# Patient Record
Sex: FEMALE | Race: BLACK OR AFRICAN AMERICAN | NOT HISPANIC OR LATINO | Employment: UNEMPLOYED | URBAN - METROPOLITAN AREA
[De-identification: names, ages, dates, MRNs, and addresses within clinical notes are randomized per-mention and may not be internally consistent; named-entity substitution may affect disease eponyms.]

---
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
## 2024-02-14 ENCOUNTER — OFFICE VISIT (OUTPATIENT)
Dept: RHEUMATOLOGY | Facility: CLINIC | Age: 19
End: 2024-02-14
Attending: PEDIATRICS
Payer: COMMERCIAL

## 2024-02-14 VITALS
SYSTOLIC BLOOD PRESSURE: 119 MMHG | OXYGEN SATURATION: 100 % | TEMPERATURE: 97.6 F | WEIGHT: 147.93 LBS | RESPIRATION RATE: 16 BRPM | DIASTOLIC BLOOD PRESSURE: 77 MMHG | HEIGHT: 67 IN | HEART RATE: 84 BPM | BODY MASS INDEX: 23.22 KG/M2

## 2024-02-14 DIAGNOSIS — M08.3 POLYARTICULAR RF NEGATIVE JIA (JUVENILE IDIOPATHIC ARTHRITIS) (H): Primary | ICD-10-CM

## 2024-02-14 PROCEDURE — 99214 OFFICE O/P EST MOD 30 MIN: CPT | Performed by: PEDIATRICS

## 2024-02-14 PROCEDURE — G0463 HOSPITAL OUTPT CLINIC VISIT: HCPCS | Performed by: PEDIATRICS

## 2024-02-14 ASSESSMENT — PAIN SCALES - GENERAL: PAINLEVEL: NO PAIN (0)

## 2024-02-14 NOTE — PATIENT INSTRUCTIONS
For Patient Education Materials:  z.[facility]/farzad       [facility] Pediatric Rheumatology    For Help:  The Pediatric Call Center at [phone] can help with scheduling of routine follow up visits.  Latricia Kirk and Hayley Williamson are the Nurse Coordinators for the Division of Pediatric Rheumatology and can be reached by phone at [phone] or through [email or web address] ([email or web address]). They can help with questions about your child s rheumatic condition, medications, and test results.  For emergencies after hours or on the weekends, please call the page  at [phone] and ask to speak to the physician on-call for Pediatric Rheumatology. Please do not use [email or web address] for urgent requests.  Main  Services:  [phone]  Hmong/Costa Rican/Mauritian: [phone]  Citizen of [place]: [phone]  Beninese: [phone]    Internal Referrals: If we refer your child to another physician/team within [facility], you should receive a call to set this up. If you do not hear anything within a week, please call the Call Center at [phone].    External Referrals: If we refer your child to a physician/team outside of [facility], our team will send the referral order and relevant records to them. We ask that you call the place where your child is being referred to ensure they received the needed information and notify our team coordinators if not.    Imaging: If your child needs an imaging study that is not being performed the day of your clinic appointment, please call to set this up. For xrays, ultrasounds, and echocardiogram call [phone]. For CT or MRI call [phone].     MyChart: We encourage you to sign up for [employer] at [email or web address]. For assistance or questions, call [phone]. If your child is 12 years or older, a consent for proxy/parent access needs to be signed so please discuss this with your physician at the next visit.

## 2024-02-14 NOTE — LETTER
February 14, 2024      Danette Lynn  [address]  [address]  [facility] [address]  2005      To Whom It May Concern:    This patient missed school 02/14/24 due to a clinic visit.     Please contact me at [phone] or our Pediatric Rheumatology nurses at [phone] for any questions or concerns.    Sincerely,      Walter Anderson MD PhD

## 2024-02-14 NOTE — LETTER
2/14/2024      RE: Danette Lynn  [address]  [address]  [facility] [address]     Dear Colleague,    Thank you for the opportunity to participate in the care of your patient, Danette Lynn, at the [facility]. Please see a copy of my visit note below.        Rheumatology History:   Date of symptom onset: 2/15/2013  Date of first visit to center: 5/15/2013  Date of PATRICIA diagnosis: 5/15/2013  ILAR category: polyarticular (RF-negative)  RJ Status: negative   RF Status: negative   CCP Status: negative   HLA-B27 Status: not done        Ophthalmology History:   Iritis/Uveitis Comorbidity: no   Date of last eye exam:            Medications:   As of completion of this visit:  Current Outpatient Medications   Medication Sig Dispense Refill    adalimumab (HUMIRA *CF*) 40 MG/0.4ML prefilled syringe kit Inject 0.4 mLs (40 mg) Subcutaneous every 21 days PLEASE SCHEDULE AN APPOINTMENT 2 each 11    cetirizine HCl (ZYRTEC ALLERGY) 10 MG CAPS Take 1 capsule (10 mg) by mouth daily 30 capsule 11    multivitamin, therapeutic (THERA-VIT) TABS tablet Take 1 tablet by mouth daily (Patient not taking: Reported on 2/8/2023) 30 tablet 11         Danette is tolerating the medication(s) well.       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  Danette was last seen in our clinic on 2/8/2023 and returns today accompanied by her father.  The encounter diagnosis was Polyarticular RF negative PATRICIA (juvenile  "idiopathic arthritis) (H).     I have not seen her in nearly a year.  She is doing very well.  She had been spacing out the Humira and actually stopped taking it in September 2023 (5 months ago) she reports no symptoms of arthritis such as joint stiffness swelling warmth or pain.  Her overall health has been good although she has a mild cold right now.    She graduated from high school and is enrolled at [employer].  She is planning to major in biology and hopes to become a PA.  She is living at home with her family.      Information per our standardized questionnaire is as below:    Self Report  Patient Pain Status: 0 (This is measured 0 = no pain, 10 = very severe pain)  Patient Global Assessment of Disease Activity: 0 (This is measured 0 = very well, 10 = very poorly)  Patient Highest Level of Education: high school     Interim Arthritis History  Morning Stiffness in the past week: no stiffness  Recent Back Pain: No    Since your last visit has your arthritis stopped you from trying any athletic or rigorous activities or interfaced with your ability to do these activities? No  Have you been limited your ability to do normal daily activities in the past week? No  Did you need help from other people to do normal activities in the past week? No  Have you used any aids or devices to help you do normal daily activities in the past week? No              Review of Systems:   A comprehensive review of systems was performed and was negative apart from that listed above.             Examination:   Blood pressure 119/77, pulse 84, temperature 97.6  F (36.4  C), temperature source Tympanic, resp. rate 16, height 1.707 m (5' 7.21\"), weight 67.1 kg (147 lb 14.9 oz), SpO2 100%.  Facility age limit for growth %tommy is 20 years.  Growth %ile SmartLinks can only be used for patients less than 20 years old.  Body surface area is 1.78 meters squared.     In general Danette was well appearing and in good spirits.   HEENT:  Pupils were " equal, round and reactive to light.  Nose normal.  Oropharynx moist and pink with no intraoral lesions.  NECK:  Supple, no lymphadenopathy.  CHEST:  Clear to auscultation.  HEART:  Regular rate and rhythm.  No murmur.  ABDOMEN:  Soft, non-tender, no hepatosplenomegaly.  JOINTS: Normal.   SKIN:  Normal.      Total active joints:  0   Total limited joints:  0  Tender entheses count:  0           Lab Test Results:   None today.       Assessment:   Danette is a 19 year old  with   1. 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  This is a very unusual but obviously desirable outcome.  I think it is fine for her to continue off medications for arthritis.    ACR Functional Class: Normal  Provider assessment of disease activity: 0 (This is measured 0 = inactive 10 = highly active)    Treat to Target:   [id number] score: 0           Plan:     Continue off medications for arthritis  Continue screening eye exams for uveitis yearly.  He may follow-up with me on an as-needed basis.  We had her sign up for [employer] today so that she can reach us easily.  When she finishes college she would be more appropriate for her to see an adult rheumatologist, if she needs ongoing rheumatology care.      It is a pleasure to continue to participate in Danette's care.  Please feel free to contact me with any questions or concerns you have regarding Danette's care. If there are any new questions or concerns, I would be glad to help and can be reached through our main office at [phone] or our paging  at [phone].    Walter Anderson MD, PhD  Professor, Pediatric Rheumatology    30 min spent on the date of the encounter in chart review, patient visit, review of tests, documentation and/or discussion with other providers about the issues documented above.

## 2024-02-14 NOTE — NURSING NOTE
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     Vitals:    02/14/24 0906   BP: 119/77   BP Location: Right arm   Patient Position: Chair   Pulse: 84   Resp: 16   Temp: 97.6  F (36.4  C)   TempSrc: Tympanic   SpO2: 100%   Weight: 147 lb 14.9 oz (67.1 kg)   Height: 5' 7.21\" (170.7 cm)           Luisana Covarrubias M.A.    February 14, 2024  "

## 2024-02-14 NOTE — PROGRESS NOTES
Rheumatology History:   Date of symptom onset: 2/15/2013  Date of first visit to center: 5/15/2013  Date of PATRICIA diagnosis: 5/15/2013  ILAR category: polyarticular (RF-negative)  RJ Status: negative   RF Status: negative   CCP Status: negative   HLA-B27 Status: not done        Ophthalmology History:   Iritis/Uveitis Comorbidity: no   Date of last eye exam:            Medications:   As of completion of this visit:  Current Outpatient Medications   Medication Sig Dispense Refill    adalimumab (HUMIRA *CF*) 40 MG/0.4ML prefilled syringe kit Inject 0.4 mLs (40 mg) Subcutaneous every 21 days PLEASE SCHEDULE AN APPOINTMENT 2 each 11    cetirizine HCl (ZYRTEC ALLERGY) 10 MG CAPS Take 1 capsule (10 mg) by mouth daily 30 capsule 11    multivitamin, therapeutic (THERA-VIT) TABS tablet Take 1 tablet by mouth daily (Patient not taking: Reported on 2/8/2023) 30 tablet 11         Danette is tolerating the medication(s) well.       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  Danette was last seen in our clinic on 2/8/2023 and returns today accompanied by her father.  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     I have not seen her in nearly a year.  She is doing very well.  She had been spacing out the Humira and actually stopped taking it in September 2023 (5 months ago) she reports no symptoms of arthritis such as joint stiffness swelling warmth or pain.  Her overall health has been good although she has a mild cold right now.    She  "graduated from high school and is enrolled at [employer].  She is planning to major in biology and hopes to become a PA.  She is living at home with her family.      Information per our standardized questionnaire is as below:    Self Report  Patient Pain Status: 0 (This is measured 0 = no pain, 10 = very severe pain)  Patient Global Assessment of Disease Activity: 0 (This is measured 0 = very well, 10 = very poorly)  Patient Highest Level of Education: high school     Interim Arthritis History  Morning Stiffness in the past week: no stiffness  Recent Back Pain: No    Since your last visit has your arthritis stopped you from trying any athletic or rigorous activities or interfaced with your ability to do these activities? No  Have you been limited your ability to do normal daily activities in the past week? No  Did you need help from other people to do normal activities in the past week? No  Have you used any aids or devices to help you do normal daily activities in the past week? No              Review of Systems:   A comprehensive review of systems was performed and was negative apart from that listed above.             Examination:   Blood pressure 119/77, pulse 84, temperature 97.6  F (36.4  C), temperature source Tympanic, resp. rate 16, height 1.707 m (5' 7.21\"), weight 67.1 kg (147 lb 14.9 oz), SpO2 100%.  Facility age limit for growth %tommy is 20 years.  Growth %ile SmartLinks can only be used for patients less than 20 years old.  Body surface area is 1.78 meters squared.     In general Danette was well appearing and in good spirits.   HEENT:  Pupils were equal, round and reactive to light.  Nose normal.  Oropharynx moist and pink with no intraoral lesions.  NECK:  Supple, no lymphadenopathy.  CHEST:  Clear to auscultation.  HEART:  Regular rate and rhythm.  No murmur.  ABDOMEN:  Soft, non-tender, no hepatosplenomegaly.  JOINTS: Normal.   SKIN:  Normal.      Total active joints:  0   Total limited joints:  " 0  Tender entheses count:  0           Lab Test Results:   None today.       Assessment:   Danette is a 19 year old  with   1. 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  This is a very unusual but obviously desirable outcome.  I think it is fine for her to continue off medications for arthritis.    ACR Functional Class: Normal  Provider assessment of disease activity: 0 (This is measured 0 = inactive 10 = highly active)    Treat to Target:   [id number] score: 0           Plan:     Continue off medications for arthritis  Continue screening eye exams for uveitis yearly.  He may follow-up with me on an as-needed basis.  We had her sign up for [employer] today so that she can reach us easily.  When she finishes college she would be more appropriate for her to see an adult rheumatologist, if she needs ongoing rheumatology care.      It is a pleasure to continue to participate in Danette's care.  Please feel free to contact me with any questions or concerns you have regarding Danette's care. If there are any new questions or concerns, I would be glad to help and can be reached through our main office at [phone] or our paging  at [phone].    Walter Anderson MD, PhD  Professor, Pediatric Rheumatology    30 min spent on the date of the encounter in chart review, patient visit, review of tests, documentation and/or discussion with other providers about the issues documented above.

## 2024-02-14 NOTE — NURSING NOTE
Peds Outpatient BP  1) Rested for 5 minutes, BP taken on bare arm, patient sitting (or supine for infants) w/ legs uncrossed?   Yes  2) Right arm used?  Right arm   Yes  3) Arm circumference of largest part of upper arm (in cm): 28  4) BP cuff sized used: Adult (25-32cm)   If used different size cuff then what was recommended why? N/A  5) First BP reading:machine   BP Readings from Last 1 Encounters:   02/14/24 119/77      Is reading >90%?No   (90% for <1 years is 90/50)  (90% for >18 years is 140/90)  *If a machine BP is at or above 90% take manual BP  6) Manual BP reading: N/A  7) Other comments: None    Luisana Covarrubias CMA.

## 2024-02-18 ENCOUNTER — HEALTH MAINTENANCE LETTER (OUTPATIENT)
Age: 19
End: 2024-02-18

## 2024-03-06 ENCOUNTER — TELEPHONE (OUTPATIENT)
Dept: RHEUMATOLOGY | Facility: CLINIC | Age: 19
End: 2024-03-06
Payer: COMMERCIAL

## 2024-03-06 DIAGNOSIS — M08.3 POLYARTICULAR RF NEGATIVE JIA (JUVENILE IDIOPATHIC ARTHRITIS) (H): ICD-10-CM

## 2024-03-06 RX ORDER — ADALIMUMAB 40MG/0.4ML
40 KIT SUBCUTANEOUS
Qty: 2 EACH | Refills: 11 | Status: SHIPPED | OUTPATIENT
Start: 2024-03-06

## 2024-03-06 NOTE — TELEPHONE ENCOUNTER
PA Initiation    Medication: HUMIRA *CF* 40 MG/0.4ML SC PSKT  Insurance Company: [employer] - Phone [phone] Fax [phone]  Pharmacy Filling the Rx:    Filling Pharmacy Phone:    Filling Pharmacy Fax:    Start Date: 3/6/2024

## 2024-03-07 NOTE — TELEPHONE ENCOUNTER
Prior Authorization Approval    Medication: HUMIRA *CF* 40 MG/0.4ML SC PSKT  Authorization Effective Date: 3/7/2024  Authorization Expiration Date: 3/7/2025  Approved Dose/Quantity:   Reference #: Key: [id number]   Insurance Company: [employer] Phone [phone] Fax [phone]  Expected CoPay: $    CoPay Card Available:      Financial Assistance Needed:   Which Pharmacy is filling the prescription:    Pharmacy Notified: released new Rx marked urgent and sent email  Patient Notified:  sent My Chart msg

## 2025-03-09 ENCOUNTER — HEALTH MAINTENANCE LETTER (OUTPATIENT)
Age: 20
End: 2025-03-09

## 2025-03-12 DIAGNOSIS — M08.3 POLYARTICULAR RF NEGATIVE JIA (JUVENILE IDIOPATHIC ARTHRITIS) (H): ICD-10-CM

## 2025-03-12 RX ORDER — ADALIMUMAB 40MG/0.4ML
40 KIT SUBCUTANEOUS
Qty: 2 EACH | Refills: 11 | Status: CANCELLED | OUTPATIENT
Start: 2025-03-12

## 2025-03-14 DIAGNOSIS — M08.3 POLYARTICULAR RF NEGATIVE JIA (JUVENILE IDIOPATHIC ARTHRITIS) (H): ICD-10-CM

## 2025-03-17 RX ORDER — ADALIMUMAB 40MG/0.4ML
40 KIT SUBCUTANEOUS
Qty: 2 EACH | Refills: 0 | OUTPATIENT
Start: 2025-03-17

## 2025-03-17 NOTE — TELEPHONE ENCOUNTER
Hello this was approved but not yet released to us and pt has a del set for 3/21 can we please get this sent over to us please and thank you

## 2025-03-18 NOTE — CONFIDENTIAL NOTE
[facility]  I am covering Dr. Anderson's inbox while he is out of the office.  Has an appointment with Dr. Anderson on 3/27/2025 and can get prescription decision then.      Mireya Sutton M.D.   of Pediatrics  Pediatric Rheumatology

## 2025-03-21 ENCOUNTER — TELEPHONE (OUTPATIENT)
Dept: RHEUMATOLOGY | Facility: CLINIC | Age: 20
End: 2025-03-21

## 2025-03-21 NOTE — TELEPHONE ENCOUNTER
[facility] Call Center    Phone Message    May a detailed message be left on voicemail: yes     Reason for Call: Medication Refill Request    Has the patient contacted the pharmacy for the refill? Yes   Name of medication being requested: Humira  Provider who prescribed the medication: Walter Anderson  Pharmacy: [place] [facility]   Date medication is needed: 03/21/2025       Action Taken: Message routed to:  Other: Peds Rheumatology     Travel Screening: Not Applicable     Date of Service:

## 2025-03-27 ENCOUNTER — OFFICE VISIT (OUTPATIENT)
Dept: RHEUMATOLOGY | Facility: CLINIC | Age: 20
End: 2025-03-27
Attending: PEDIATRICS
Payer: COMMERCIAL

## 2025-03-27 VITALS
SYSTOLIC BLOOD PRESSURE: 117 MMHG | TEMPERATURE: 97.8 F | BODY MASS INDEX: 21.63 KG/M2 | HEIGHT: 67 IN | OXYGEN SATURATION: 98 % | DIASTOLIC BLOOD PRESSURE: 73 MMHG | HEART RATE: 91 BPM | WEIGHT: 137.79 LBS

## 2025-03-27 DIAGNOSIS — M08.3 POLYARTICULAR RF NEGATIVE JIA (JUVENILE IDIOPATHIC ARTHRITIS) (H): ICD-10-CM

## 2025-03-27 LAB
ALT SERPL W P-5'-P-CCNC: 8 U/L (ref 0–50)
AST SERPL W P-5'-P-CCNC: 13 U/L (ref 0–45)
BASOPHILS # BLD AUTO: 0 10E3/UL (ref 0–0.2)
BASOPHILS NFR BLD AUTO: 0 %
CRP SERPL-MCNC: <3 MG/L
EOSINOPHIL # BLD AUTO: 0.1 10E3/UL (ref 0–0.7)
EOSINOPHIL NFR BLD AUTO: 1 %
ERYTHROCYTE [DISTWIDTH] IN BLOOD BY AUTOMATED COUNT: 12.5 % (ref 10–15)
ERYTHROCYTE [SEDIMENTATION RATE] IN BLOOD BY WESTERGREN METHOD: 25 MM/HR (ref 0–20)
HCT VFR BLD AUTO: 40.8 % (ref 35–47)
HGB BLD-MCNC: 13.1 G/DL (ref 11.7–15.7)
IMM GRANULOCYTES # BLD: 0 10E3/UL
IMM GRANULOCYTES NFR BLD: 0 %
LYMPHOCYTES # BLD AUTO: 1.9 10E3/UL (ref 0.8–5.3)
LYMPHOCYTES NFR BLD AUTO: 28 %
MCH RBC QN AUTO: 27.8 PG (ref 26.5–33)
MCHC RBC AUTO-ENTMCNC: 32.1 G/DL (ref 31.5–36.5)
MCV RBC AUTO: 87 FL (ref 78–100)
MONOCYTES # BLD AUTO: 0.5 10E3/UL (ref 0–1.3)
MONOCYTES NFR BLD AUTO: 7 %
NEUTROPHILS # BLD AUTO: 4.4 10E3/UL (ref 1.6–8.3)
NEUTROPHILS NFR BLD AUTO: 64 %
NRBC # BLD AUTO: 0 10E3/UL
NRBC BLD AUTO-RTO: 0 /100
PLATELET # BLD AUTO: 172 10E3/UL (ref 150–450)
RBC # BLD AUTO: 4.71 10E6/UL (ref 3.8–5.2)
WBC # BLD AUTO: 6.9 10E3/UL (ref 4–11)

## 2025-03-27 PROCEDURE — 85041 AUTOMATED RBC COUNT: CPT | Performed by: PEDIATRICS

## 2025-03-27 PROCEDURE — 36415 COLL VENOUS BLD VENIPUNCTURE: CPT | Performed by: PEDIATRICS

## 2025-03-27 PROCEDURE — 84450 TRANSFERASE (AST) (SGOT): CPT | Performed by: PEDIATRICS

## 2025-03-27 PROCEDURE — 85652 RBC SED RATE AUTOMATED: CPT | Performed by: PEDIATRICS

## 2025-03-27 PROCEDURE — 85004 AUTOMATED DIFF WBC COUNT: CPT | Performed by: PEDIATRICS

## 2025-03-27 PROCEDURE — 84460 ALANINE AMINO (ALT) (SGPT): CPT | Performed by: PEDIATRICS

## 2025-03-27 PROCEDURE — 86140 C-REACTIVE PROTEIN: CPT | Performed by: PEDIATRICS

## 2025-03-27 RX ORDER — ADALIMUMAB 40MG/0.4ML
40 KIT SUBCUTANEOUS
Qty: 2 EACH | Refills: 11 | OUTPATIENT
Start: 2025-03-27

## 2025-03-27 ASSESSMENT — PAIN SCALES - GENERAL: PAINLEVEL_OUTOF10: NO PAIN (0)

## 2025-03-27 NOTE — PATIENT INSTRUCTIONS
For Patient Education Materials:  z.[facility]/farzad       [facility] Physicians Pediatric Rheumatology    For Help:  The Pediatric Call Center at [phone] can help with scheduling of routine follow up visits.  Latricia Kirk and Hayley Williamson are the Nurse Coordinators for the Division of Pediatric Rheumatology and can be reached by phone at [phone] or through [employer] ([email or web address]). They can help with questions about your child s rheumatic condition, medications, and test results.  For emergencies after hours or on the weekends, please call the page  at [phone] and ask to speak to the physician on-call for Pediatric Rheumatology. Please do not use [employer] for urgent requests.  Main  Services:  [phone]  Hmong/Hong Konger/Monegasque: [phone]  Macanese: [phone]  Croatian: [phone]    Internal Referrals: If we refer your child to another physician/team within [facility], you should receive a call to set this up. If you do not hear anything within a week, please call the Call Center at [phone].    External Referrals: If we refer your child to a physician/team outside of [facility], our team will send the referral order and relevant records to them. We ask that you call the place where your child is being referred to ensure they received the needed information and notify our team coordinators if not.    Imaging: If your child needs an imaging study that is not being performed the day of your clinic appointment, please call to set this up. For xrays, ultrasounds, and echocardiogram call [phone]. For CT or MRI call [phone].     MyChart: We encourage you to sign up for [employer] at [email or web address]. For assistance or questions, call [phone]. If your child is 12 years or older, a consent for proxy/parent access needs to be signed so please discuss this with your physician at the next visit.

## 2025-03-27 NOTE — NURSING NOTE
"Chief Complaint   Patient presents with    RECHECK       Vitals:    03/27/25 1006   BP: 117/73   BP Location: Right arm   Patient Position: Sitting   Cuff Size: Adult Regular   Pulse: 91   Temp: 97.8  F (36.6  C)   TempSrc: Skin   SpO2: 98%   Weight: 137 lb 12.6 oz (62.5 kg)   Height: 5' 7.36\" (171.1 cm)     Patient MyChart Active? Yes    Does patient need PHQ-2 completed today? Yes    Jb Olmos  March 27, 2025  "

## 2025-03-27 NOTE — PROGRESS NOTES
"    Rheumatology History:   Date of symptom onset: 2/15/2013  Date of first visit to center: 5/15/2013  Date of PATRICIA diagnosis: 5/15/2013  ILAR category: polyarticular (RF-negative)  RJ Status: negative   RF Status: negative   CCP Status: negative   HLA-B27 Status: not done        Ophthalmology History:   Iritis/Uveitis Comorbidity: no   Date of last eye exam:   \"a couple of years ago\"         Medications:   As of completion of this visit:  Current Outpatient Medications   Medication Sig Dispense Refill    adalimumab (HUMIRA *CF*) 40 MG/0.4ML prefilled syringe kit Inject 0.4 mLs (40 mg) subcutaneously every 14 days. 2 each 11         Danette is tolerating the medication(s) well.       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  Danette was last seen in our clinic on 2/14/2024 and returns today for scheduled follow up.  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     It has been over a year since I last saw her.  She had been spacing out the adalimumab, and then was taking it sporadically.  Around 2 months ago she noticed that her fingers and feet were becoming more swollen.  It was difficult for her to wear certain shoes.  She therefore contacted my office and restarted the Humira.  She has now had a couple of doses of the usual every 14-day dosing.  She feels that her fingers and feet are getting better.  She does continue to have a little bit of stiffness in the morning.    Her overall health has been good.    She is going to " "[employer] and is hoping to become a PA or nurse.  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  She continues to live at home with her family.      Information per our standardized questionnaire is as below:    Self Report  Patient Pain Status: 2 (This is measured 0 = no pain, 10 = very severe pain)  Patient Global Assessment of Disease Activity: 1.5 (This is measured 0 = very well, 10 = very poorly)  Patient Highest Level of Education: high school     Interim Arthritis History  Morning Stiffness in the past week: no stiffness  Recent Back Pain: No    Since your last visit has your arthritis stopped you from trying any athletic or rigorous activities or interfaced with your ability to do these activities? No  Have you been limited your ability to do normal daily activities in the past week? No  Did you need help from other people to do normal activities in the past week? No  Have you used any aids or devices to help you do normal daily activities in the past week? No            Review of Systems:   A comprehensive review of systems was performed and was negative apart from that listed above.  She has         Examination:   Blood pressure 117/73, pulse 91, temperature 97.8  F (36.6  C), temperature source Skin, height 1.711 m (5' 7.36\"), weight 62.5 kg (137 lb 12.6 oz), SpO2 98%.  Facility age limit for growth %tommy is 20 years.  Growth %ile SmartLinks can only be used for patients less than 20 years old.  Body surface area is 1.72 meters squared.     In general Danette was well appearing and in good spirits.   HEENT:  Pupils were equal, round and reactive to light.  Nose normal.  Oropharynx moist and pink with no intraoral lesions.  NECK:  Supple, no lymphadenopathy.  CHEST:  Clear to auscultation.  HEART:  Regular rate and rhythm.  No murmur.  ABDOMEN:  Soft, non-tender, no hepatosplenomegaly.  JOINTS: Mild restriction of motion of the PIP joints of the left fifth and fourth fingers and the right " second and fourth fingers.  Her other joints were normal.   SKIN:  Normal.      Total active joints:  4   Total limited joints:  0  Tender entheses count:  0  SI Tenderness: No         Lab Test Results:          Assessment:   Danette is a 20 year old  with   1. 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  I think that getting her back on the typical dose frequency of every 2 weeks will be helpful.  I would like to see her in follow-up in about 3 months to ensure that her arthritis has been brought back under control.  If not we may need to add additional medications.    ACR Functional Class: Normal  Provider assessment of disease activity: 0.5 (This is measured 0 = inactive 10 = highly active)      Treat to Target:   mLHBGI96 score: 6            Plan:     Use adalimumab 40 mg every 14 days.  Continue screening eye exams for uveitis yearly.  She is overdue for this.  Follow up in 3 to 4 months.      It is a pleasure to continue to participate in Danette's care.  Please feel free to contact me with any questions or concerns you have regarding Danette's care. If there are any new questions or concerns, I would be glad to help and can be reached through our main office at [phone] or our paging  at [phone].    Walter Anderson MD, PhD  Professor, Pediatric Rheumatology    The longitudinal plan of care for   1. 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 Due to the added complexity in care, I will continue to support Danette in the subsequent management of this condition(s) and with the ongoing continuity of care of this condition(s).      30 min spent on the date of the encounter in chart review, patient visit, review of tests, documentation and/or discussion with other providers about the issues documented above.   Communications

## 2025-04-21 ENCOUNTER — PHARMACY VISIT (OUTPATIENT)
Dept: ADMINISTRATIVE | Facility: CLINIC | Age: 20
End: 2025-04-21
Payer: COMMERCIAL

## 2025-04-21 NOTE — PROGRESS NOTES
Rheumatoid Arthritis Clinical Follow Up Assessment  Spoke with Danette for assessment. Current Regimen: Humira 40mg QOW     Humira/PATRICIA: Patient denies side effects, injection site reactions, missed doses, medication/allergy changes and she has no questions/concerns about Humira treatment today. She has noticed less inflammation and pain since restarting every 14 day dosing at the end of February with no missed doses since then.     Follow-Up: Patient declines active TM calls. Will attempt reengagement in 1 year    Karey Villasenor, PharmD  Therapy Management Pharmacist  [facility]  [facility]

## 2025-07-09 ENCOUNTER — OFFICE VISIT (OUTPATIENT)
Dept: RHEUMATOLOGY | Facility: CLINIC | Age: 20
End: 2025-07-09
Attending: PEDIATRICS
Payer: COMMERCIAL

## 2025-07-09 VITALS
WEIGHT: 135.8 LBS | RESPIRATION RATE: 16 BRPM | HEART RATE: 84 BPM | DIASTOLIC BLOOD PRESSURE: 73 MMHG | TEMPERATURE: 98.1 F | SYSTOLIC BLOOD PRESSURE: 117 MMHG | HEIGHT: 68 IN | OXYGEN SATURATION: 100 % | BODY MASS INDEX: 20.58 KG/M2

## 2025-07-09 DIAGNOSIS — M08.3 POLYARTICULAR RF NEGATIVE JIA (JUVENILE IDIOPATHIC ARTHRITIS) (H): Primary | ICD-10-CM

## 2025-07-09 PROCEDURE — G0463 HOSPITAL OUTPT CLINIC VISIT: HCPCS | Performed by: PEDIATRICS

## 2025-07-09 ASSESSMENT — PAIN SCALES - GENERAL: PAINLEVEL_OUTOF10: NO PAIN (0)

## 2025-07-09 NOTE — PATIENT INSTRUCTIONS
For Patient Education Materials:  z.[facility]/farzad       [facility] Physicians Pediatric Rheumatology    For Help:  The Pediatric Call Center at [phone] can help with scheduling of routine follow up visits.  Latricia Kirk and Hayley Williamson are the Nurse Coordinators for the Division of Pediatric Rheumatology and can be reached by phone at [phone] or through [employer] ([email or web address]). They can help with questions about your child s rheumatic condition, medications, and test results.  For emergencies after hours or on the weekends, please call the page  at [phone] and ask to speak to the physician on-call for Pediatric Rheumatology. Please do not use [employer] for urgent requests.  Main  Services:  [phone]  Hmong/Kuwaiti/Palauan: [phone]  Citizen of [place]: [phone]  Welsh: [phone]    Internal Referrals: If we refer your child to another physician/team within [facility], you should receive a call to set this up. If you do not hear anything within a week, please call the Call Center at [phone].    External Referrals: If we refer your child to a physician/team outside of [facility], our team will send the referral order and relevant records to them. We ask that you call the place where your child is being referred to ensure they received the needed information and notify our team coordinators if not.    Imaging: If your child needs an imaging study that is not being performed the day of your clinic appointment, please call to set this up. For xrays, ultrasounds, and echocardiogram call [phone]. For CT or MRI call [phone].     MyChart: We encourage you to sign up for [employer] at [email or web address]. For assistance or questions, call [phone]. If your child is 12 years or older, a consent for proxy/parent access needs to be signed so please discuss this with your physician at the next visit.

## 2025-07-09 NOTE — LETTER
7/9/2025      RE: Danette Lynn  [address]  [address]  [facility] [address]     Dear Colleague,    Thank you for the opportunity to participate in the care of your patient, Danette Lynn, at the [facility] at [facility]. Please see a copy of my visit note below.        Rheumatology History:     Date of symptom onset: 2/15/2013  Date of first visit to center: 5/15/2013  Date of PATRICIA diagnosis: 5/15/2013  ILAR category: polyarticular (RF-negative)  RJ Status: negative   RF Status: negative   CCP Status: negative   HLA-B27 Status: not done        Ophthalmology History:     Iritis/Uveitis Comorbidity: no   Date of last eye exam:   unknown         Medications:     As of completion of this visit:  Current Outpatient Medications   Medication Sig Dispense Refill     adalimumab (HUMIRA *CF*) 40 MG/0.4ML prefilled syringe kit Inject 0.4 mLs (40 mg) subcutaneously every 14 days. 2 each 11         Danette is tolerating the medication(s) well.       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  Danette was last seen in our clinic on 3/27/2025 and returns today for scheduled follow-up.  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     At last visit she had had a flare of her arthritis after discontinuing the adalimumab.  We therefore restarted it.  She takes it approximately once or twice per month.  She does not track how often she takes it.  Rather  "she waits until she has breakthrough symptoms such as pain in her knees or fingers.  When she does take the adalimumab, the joint pain resolves quickly.  We had a discussion about tracking more carefully how often she is actually taking it.    She finished her sophomore year at [employer].  She is considering becoming a PA or a nurse.  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      Information per our standardized questionnaire is as below:    Self Report  Patient Pain Status: 0 (This is measured 0 = no pain, 10 = very severe pain)  Patient Global Assessment of Disease Activity: 0 (This is measured 0 = very well, 10 = very poorly)  Patient Highest Level of Education: high school     Interim Arthritis History  Morning Stiffness in the past week: no stiffness  Recent Back Pain: No    Since your last visit has your arthritis stopped you from trying any athletic or rigorous activities or interfaced with your ability to do these activities? No  Have you been limited your ability to do normal daily activities in the past week? No  Did you need help from other people to do normal activities in the past week? No  Have you used any aids or devices to help you do normal daily activities in the past week? No    Important Medical Events  Patient has experienced drug-related serious adverse events since last encounter?: No                   Review of Systems:     A comprehensive review of systems was performed and was negative apart from that listed above.           Examination:     Blood pressure 117/73, pulse 84, temperature 98.1  F (36.7  C), temperature source Skin, resp. rate 16, height 1.715 m (5' 7.52\"), weight 61.6 kg (135 lb 12.9 oz), SpO2 100%.  Facility age limit for growth %tommy is 20 years.  Growth %ile SmartLinks can only be used for patients less than 20 years old.  Body surface area is 1.71 meters squared.     In general Danette was well appearing and in good spirits.   HEENT:  Pupils " were equal, round and reactive to light.  Nose normal.  Oropharynx moist and pink with no intraoral lesions.  NECK:  Supple, no lymphadenopathy.  CHEST:  Clear to auscultation.  HEART:  Regular rate and rhythm.  No murmur.  ABDOMEN:  Soft, non-tender, no hepatosplenomegaly.  JOINTS: Normal.   SKIN:  Normal.      Total active joints:  0   Total limited joints:  0  Tender entheses count:  0  SI Tenderness: No         Lab Test Results:   None today         Assessment:     Danette is a 20 year old  with   1. 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  I am inclined to make no changes in the medication regimen.  I would like for her to track more carefully how often she is taking the adalimumab, and try to get on a more routine schedule even if it is every 3 or every 4 weeks rather than the typical every 2 weeks.    ACR Functional Class: Normal  Provider assessment of disease activity: 0 (This is measured 0 = inactive 10 = highly active)    Treat to Target:   [id number] score: 0           Plan:     Continue adalimumab approximately every 3 weeks.  Continue screening eye exams for uveitis yearly.  She does not recall the last time she was at the ophthalmologist.  I did place a referral to adult ophthalmology so that she can establish care.  She is at low risk, but still needs annual visits.  Follow up in 6 months.      It is a pleasure to continue to participate in Danette's care.  Please feel free to contact me with any questions or concerns you have regarding Danette's care. If there are any new questions or concerns, I would be glad to help and can be reached through our main office at [phone] or our paging  at [phone].    Walter Anderson MD, PhD  Professor, Pediatric Rheumatology    The longitudinal plan of care for   1. 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  Due to the added complexity in care, I will continue to support Danette in the subsequent management of this condition(s) and with the ongoing continuity of care of this condition(s).      20 min spent on the date of the encounter in chart review, patient visit, review of tests, documentation and/or discussion with other providers about the issues documented above.

## 2025-07-09 NOTE — PROGRESS NOTES
Rheumatology History:     Date of symptom onset: 2/15/2013  Date of first visit to center: 5/15/2013  Date of PATRICIA diagnosis: 5/15/2013  ILAR category: polyarticular (RF-negative)  RJ Status: negative   RF Status: negative   CCP Status: negative   HLA-B27 Status: not done        Ophthalmology History:     Iritis/Uveitis Comorbidity: no   Date of last eye exam:   unknown         Medications:     As of completion of this visit:  Current Outpatient Medications   Medication Sig Dispense Refill    adalimumab (HUMIRA *CF*) 40 MG/0.4ML prefilled syringe kit Inject 0.4 mLs (40 mg) subcutaneously every 14 days. 2 each 11         Danette is tolerating the medication(s) well.       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  Danette was last seen in our clinic on 3/27/2025 and returns today for scheduled follow-up.  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     At last visit she had had a flare of her arthritis after discontinuing the adalimumab.  We therefore restarted it.  She takes it approximately once or twice per month.  She does not track how often she takes it.  Rather she waits until she has breakthrough symptoms such as pain in her knees or fingers.  When she does take the adalimumab, the joint pain resolves quickly.  We had a discussion about tracking more carefully how often she is actually taking it.    She finished her sophomore year at [employer].  She is considering becoming a PA or a nurse.  She is working the  "summer at the [employer] on the rides.      Information per our standardized questionnaire is as below:    Self Report  Patient Pain Status: 0 (This is measured 0 = no pain, 10 = very severe pain)  Patient Global Assessment of Disease Activity: 0 (This is measured 0 = very well, 10 = very poorly)  Patient Highest Level of Education: high school     Interim Arthritis History  Morning Stiffness in the past week: no stiffness  Recent Back Pain: No    Since your last visit has your arthritis stopped you from trying any athletic or rigorous activities or interfaced with your ability to do these activities? No  Have you been limited your ability to do normal daily activities in the past week? No  Did you need help from other people to do normal activities in the past week? No  Have you used any aids or devices to help you do normal daily activities in the past week? No    Important Medical Events  Patient has experienced drug-related serious adverse events since last encounter?: No                   Review of Systems:     A comprehensive review of systems was performed and was negative apart from that listed above.           Examination:     Blood pressure 117/73, pulse 84, temperature 98.1  F (36.7  C), temperature source Skin, resp. rate 16, height 1.715 m (5' 7.52\"), weight 61.6 kg (135 lb 12.9 oz), SpO2 100%.  Facility age limit for growth %tommy is 20 years.  Growth %ile SmartLinks can only be used for patients less than 20 years old.  Body surface area is 1.71 meters squared.     In general Danette was well appearing and in good spirits.   HEENT:  Pupils were equal, round and reactive to light.  Nose normal.  Oropharynx moist and pink with no intraoral lesions.  NECK:  Supple, no lymphadenopathy.  CHEST:  Clear to auscultation.  HEART:  Regular rate and rhythm.  No murmur.  ABDOMEN:  Soft, non-tender, no hepatosplenomegaly.  JOINTS: Normal.   SKIN:  Normal.      Total active joints:  0   Total limited joints:  " 0  Tender entheses count:  0  SI Tenderness: No         Lab Test Results:   None today         Assessment:     Danette is a 20 year old  with   1. 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  I am inclined to make no changes in the medication regimen.  I would like for her to track more carefully how often she is taking the adalimumab, and try to get on a more routine schedule even if it is every 3 or every 4 weeks rather than the typical every 2 weeks.    ACR Functional Class: Normal  Provider assessment of disease activity: 0 (This is measured 0 = inactive 10 = highly active)    Treat to Target:   [id number] score: 0           Plan:     Continue adalimumab approximately every 3 weeks.  Continue screening eye exams for uveitis yearly.  She does not recall the last time she was at the ophthalmologist.  I did place a referral to adult ophthalmology so that she can establish care.  She is at low risk, but still needs annual visits.  Follow up in 6 months.      It is a pleasure to continue to participate in Danette's care.  Please feel free to contact me with any questions or concerns you have regarding Danette's care. If there are any new questions or concerns, I would be glad to help and can be reached through our main office at [phone] or our paging  at [phone].    Walter Anderson MD, PhD  Professor, Pediatric Rheumatology    The longitudinal plan of care for   1. 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 Due to the added complexity in care, I will continue to support Danette in the subsequent management of this condition(s) and with the ongoing continuity of care of this condition(s).      20 min spent on the date of the encounter in chart review, patient visit, review of tests, documentation and/or discussion with other providers about the issues documented  above.

## 2025-07-09 NOTE — LETTER
2025    Danette Lynn   2005        To Whom it May Concern;    Please excuse Danette Lynn from work/school for a healthcare visit on 2025.    Sincerely,        Walter Anderson MD PhD

## 2025-07-09 NOTE — NURSING NOTE
"Chief Complaint   Patient presents with    Arthritis     Polyarticular RF negative PTARICIA (juvenile idiopathic arthritis).     Vitals:    07/09/25 0959   BP: 117/73   Pulse: 84   Resp: 16   Temp: 98.1  F (36.7  C)   TempSrc: Skin   SpO2: 100%   Weight: 135 lb 12.9 oz (61.6 kg)   Height: 5' 7.52\" (171.5 cm)           Luisana Covarrubias M.A.    July 9, 2025  "

## 2025-07-09 NOTE — NURSING NOTE
Peds Outpatient BP  1) Rested for 5 minutes, BP taken on bare arm, patient sitting (or supine for infants) w/ legs uncrossed?   Yes  2) Right arm used?      Yes  3) Arm circumference of largest part of upper arm (in cm): 26  4) BP cuff sized used: Adult (25-32cm)   If used different size cuff then what was recommended why? N/A  5) First BP reading:machine   BP Readings from Last 1 Encounters:   07/09/25 117/73      Is reading >90%?No   (90% for <1 years is 90/50)  (90% for >18 years is 140/90)  *If a machine BP is at or above 90% take manual BP  6) Manual BP reading: N/A  7) Other comments: None    Luisana Covarrubias CMA.

## 2025-07-10 ENCOUNTER — PATIENT OUTREACH (OUTPATIENT)
Dept: CARE COORDINATION | Facility: CLINIC | Age: 20
End: 2025-07-10
Payer: COMMERCIAL

## 2025-07-14 ENCOUNTER — PATIENT OUTREACH (OUTPATIENT)
Dept: CARE COORDINATION | Facility: CLINIC | Age: 20
End: 2025-07-14
Payer: COMMERCIAL

## (undated) DEVICE — SOL NACL 0.9% IRRIG 1000ML BOTTLE 2F7124

## (undated) DEVICE — SYR EAR BULB 3OZ 0035830

## (undated) DEVICE — ESU GROUND PAD UNIVERSAL W/O CORD

## (undated) DEVICE — SURGICEL HEMOSTAT 4X8" 1952

## (undated) DEVICE — Device

## (undated) DEVICE — SU PDS II 4-0 RB-1 27" Z304H

## (undated) DEVICE — SPONGE LAP 18X18" X8435

## (undated) DEVICE — DRAPE U SPLIT 74X120" 29440

## (undated) DEVICE — STRAP KNEE/BODY 31143004

## (undated) DEVICE — VESSEL LOOPS RED MAXI

## (undated) DEVICE — LINEN TOWEL PACK X30 5481

## (undated) DEVICE — GLOVE PROTEXIS MICRO 7.5  2D73PM75

## (undated) DEVICE — BLADE CLIPPER SGL USE 9680

## (undated) DEVICE — PREP TECHNI-CARE CHLOROXYLENOL 3% 4OZ BOTTLE C222-4ZWO

## (undated) DEVICE — SU VICRYL 2-0 CT-2 27" J333H

## (undated) DEVICE — CLIP HORIZON MED BLUE 002200

## (undated) DEVICE — SU PDS II 3-0 RB-1 27" Z305H

## (undated) DEVICE — CLIP HORIZON SM RED WIDE SLOT 001201

## (undated) DEVICE — SUCTION MANIFOLD DORNOCH ULTRA CART UL-CL500

## (undated) DEVICE — SU MONOCRYL 5-0 P-3 18" UND Y493G

## (undated) DEVICE — DECANTER TRANSFER DEVICE 2008S

## (undated) DEVICE — TAPE MEDIPORE 2"X2YD 2862S

## (undated) RX ORDER — BUPIVACAINE HYDROCHLORIDE 2.5 MG/ML
INJECTION, SOLUTION EPIDURAL; INFILTRATION; INTRACAUDAL
Status: DISPENSED
Start: 2020-01-21

## (undated) RX ORDER — ONDANSETRON 2 MG/ML
INJECTION INTRAMUSCULAR; INTRAVENOUS
Status: DISPENSED
Start: 2020-01-21

## (undated) RX ORDER — GLYCOPYRROLATE 0.2 MG/ML
INJECTION INTRAMUSCULAR; INTRAVENOUS
Status: DISPENSED
Start: 2020-01-21

## (undated) RX ORDER — OXYCODONE HCL 5 MG/5 ML
SOLUTION, ORAL ORAL
Status: DISPENSED
Start: 2020-01-21

## (undated) RX ORDER — FENTANYL CITRATE 50 UG/ML
INJECTION, SOLUTION INTRAMUSCULAR; INTRAVENOUS
Status: DISPENSED
Start: 2020-01-21

## (undated) RX ORDER — PROPOFOL 10 MG/ML
INJECTION, EMULSION INTRAVENOUS
Status: DISPENSED
Start: 2020-01-21

## (undated) RX ORDER — LIDOCAINE HYDROCHLORIDE 20 MG/ML
INJECTION, SOLUTION EPIDURAL; INFILTRATION; INTRACAUDAL; PERINEURAL
Status: DISPENSED
Start: 2020-01-21